# Patient Record
(demographics unavailable — no encounter records)

---

## 2025-05-04 NOTE — HEALTH RISK ASSESSMENT
[Good] : ~his/her~  mood as  good [Yes] : Yes [Monthly or less (1 pt)] : Monthly or less (1 point) [1 or 2 (0 pts)] : 1 or 2 (0 points) [Never (0 pts)] : Never (0 points) [No] : In the past 12 months have you used drugs other than those required for medical reasons? No [No falls in past year] : Patient reported no falls in the past year [0] : 2) Feeling down, depressed, or hopeless: Not at all (0) [PHQ-2 Negative - No further assessment needed] : PHQ-2 Negative - No further assessment needed [Never] : Never [Fully functional (bathing, dressing, toileting, transferring, walking, feeding)] : Fully functional (bathing, dressing, toileting, transferring, walking, feeding) [Fully functional (using the telephone, shopping, preparing meals, housekeeping, doing laundry, using] : Fully functional and needs no help or supervision to perform IADLs (using the telephone, shopping, preparing meals, housekeeping, doing laundry, using transportation, managing medications and managing finances) [Smoke Detector] : smoke detector [Seat Belt] :  uses seat belt [With Patient/Caregiver] : , with patient/caregiver [Name: ___] : Health Care Proxy's Name: [unfilled]  [Relationship: ___] : Relationship: [unfilled] [Very Good] : ~his/her~  mood as very good [No Retinopathy] : No retinopathy [Patient reported colonoscopy was normal] : Patient reported colonoscopy was normal [HIV test declined] : HIV test declined [Hepatitis C test declined] : Hepatitis C test declined [None] : None [With Significant Other] : lives with significant other [Employed] : employed [Single] : single [Sexually Active] : sexually active [de-identified] : see hpi [Audit-CScore] : 1 [de-identified] : NO [de-identified] : see hpi [de-identified] : see hpi [[id number]] : 0 [EyeExamDate] : 10/23 [Change in mental status noted] : No change in mental status noted [High Risk Behavior] : no high risk behavior [Reports changes in hearing] : Reports no changes in hearing [Reports changes in vision] : Reports no changes in vision [Reports changes in dental health] : Reports no changes in dental health [ColonoscopyDate] : 10/23 [ColonoscopyComments] : FIT negative, never did colonoscopy [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  [AdvancecareDate] : 05/25

## 2025-05-04 NOTE — HEALTH RISK ASSESSMENT
[Good] : ~his/her~  mood as  good [Yes] : Yes [Monthly or less (1 pt)] : Monthly or less (1 point) [1 or 2 (0 pts)] : 1 or 2 (0 points) [Never (0 pts)] : Never (0 points) [No] : In the past 12 months have you used drugs other than those required for medical reasons? No [No falls in past year] : Patient reported no falls in the past year [0] : 2) Feeling down, depressed, or hopeless: Not at all (0) [PHQ-2 Negative - No further assessment needed] : PHQ-2 Negative - No further assessment needed [Never] : Never [Fully functional (bathing, dressing, toileting, transferring, walking, feeding)] : Fully functional (bathing, dressing, toileting, transferring, walking, feeding) [Fully functional (using the telephone, shopping, preparing meals, housekeeping, doing laundry, using] : Fully functional and needs no help or supervision to perform IADLs (using the telephone, shopping, preparing meals, housekeeping, doing laundry, using transportation, managing medications and managing finances) [Smoke Detector] : smoke detector [Seat Belt] :  uses seat belt [With Patient/Caregiver] : , with patient/caregiver [Name: ___] : Health Care Proxy's Name: [unfilled]  [Relationship: ___] : Relationship: [unfilled] [Very Good] : ~his/her~  mood as very good [No Retinopathy] : No retinopathy [Patient reported colonoscopy was normal] : Patient reported colonoscopy was normal [HIV test declined] : HIV test declined [Hepatitis C test declined] : Hepatitis C test declined [None] : None [With Significant Other] : lives with significant other [Employed] : employed [Single] : single [Sexually Active] : sexually active [de-identified] : see hpi [Audit-CScore] : 1 [de-identified] : NO [de-identified] : see hpi [de-identified] : see hpi [[id number]] : 0 [EyeExamDate] : 10/23 [Change in mental status noted] : No change in mental status noted [High Risk Behavior] : no high risk behavior [Reports changes in hearing] : Reports no changes in hearing [Reports changes in vision] : Reports no changes in vision [Reports changes in dental health] : Reports no changes in dental health [ColonoscopyDate] : 10/23 [ColonoscopyComments] : FIT negative, never did colonoscopy [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  [AdvancecareDate] : 05/25

## 2025-05-04 NOTE — HISTORY OF PRESENT ILLNESS
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  He reports stable conditions. Denies hospitalization, ED visit nor significant condition changes. His family members are all well.  He is taking Vitamin D3 daily and MVI, and no prescription medications.   Has had the umbilical protrusion, soft and no pain from childhood. No other abdominal/inguinal hernia.  He did not see ophthalmologist for over 1 yr and does not wear contact lenses that recommended for Keratoconus.  -Diet: He cut down ice coffee, sweet food, starch diet and keeps very active. Goes to gym where his brother owns. -Exercise: regularly goes to gym for work out.  Denied fever, chills,CP, SOB, abdominal pain, n/v/c/d.  Prior 10/12/23 He reports stable condition. He is attending EMT school 3days a week and not working at this time. 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 He never smoked, does not drink EtOH, and no illicit drug use.  -Diet: eats healthy. Changed diet a bit especially Breakfast. No more oatmeal but fruit or peanut/almond butter for breakfast instead of cold cereal -Exercise: very actively engaged in exercise. Goes to gym regularly. Stopped OATMEAL for breakfast  Wanted a VARIETY Denied fever, chills,CP, SOB, abdominal pain, n/v/c/d.

## 2025-05-04 NOTE — HISTORY OF PRESENT ILLNESS
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  He reports stable conditions. Denies hospitalization, ED visit nor significant condition changes. His family members are all well.  He is taking Vitamin D3 daily and MVI, and no prescription medications.   Has had the umbilical protrusion, soft and no pain from childhood. No other abdominal/inguinal hernia.  He did not see ophthalmologist for over 1 yr and does not wear contact lenses that recommended for Keratoconus.  -Diet: He cut down ice coffee, sweet food, starch diet and keeps very active. Goes to gym where his brother owns. -Exercise: regularly goes to gym for work out.  Denied fever, chills,CP, SOB, abdominal pain, n/v/c/d.  Prior 10/12/23 He reports stable condition. He is attending EMT school 3days a week and not working at this time. 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 He never smoked, does not drink EtOH, and no illicit drug use.  -Diet: eats healthy. Changed diet a bit especially Breakfast. No more oatmeal but fruit or peanut/almond butter for breakfast instead of cold cereal -Exercise: very actively engaged in exercise. Goes to gym regularly. Stopped OATMEAL for breakfast  Wanted a VARIETY Denied fever, chills,CP, SOB, abdominal pain, n/v/c/d.

## 2025-05-04 NOTE — ASSESSMENT
[Vaccines Reviewed] : Immunizations reviewed today. Please see immunization details in the vaccine log within the immunization flowsheet.  [FreeTextEntry1] : Mr. DEIRDRE CLEMENT is a 48 year old  male  with history of  impaired fasting glucose, vit D deficiency presented today for comprehensive evaluation.  # HCM -COVID shot: got once. No booster. -Flu shot: offered but did not wish.  -Tdap: 9/28/17 utd -Colonoscopy: offered but deferred. Denied GI sx. FIT Rx and test kit given today.   # keratoconus OU Reminded to wear contact lenses and to see ophthalmology soon.   # umbilical hernia no red flag sign. Clinically monitor.  -check lab as planned. -F/up in 1 year or prn.

## 2025-05-04 NOTE — HISTORY OF PRESENT ILLNESS
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  He reports stable conditions. Denies hospitalization, ED visit nor significant condition changes. His family members are all well.  He is taking Vitamin D3 daily and MVI, and no prescription medications.   Has had the umbilical protrusion, soft and no pain from childhood. No other abdominal/inguinal hernia.  He did not see ophthalmologist for over 1 yr and does not wear contact lenses that recommended for Keratoconus.  -Diet: He cut down ice coffee, sweet food, starch diet and keeps very active. Goes to gym where his brother owns. -Exercise: regularly goes to gym for work out.  Denied fever, chills,CP, SOB, abdominal pain, n/v/c/d.  Prior 10/12/23 He reports stable condition. He is attending EMT school 3days a week and not working at this time. 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 He never smoked, does not drink EtOH, and no illicit drug use.  -Diet: eats healthy. Changed diet a bit especially Breakfast. No more oatmeal but fruit or peanut/almond butter for breakfast instead of cold cereal -Exercise: very actively engaged in exercise. Goes to gym regularly. Stopped OATMEAL for breakfast  Wanted a VARIETY Denied fever, chills,CP, SOB, abdominal pain, n/v/c/d.

## 2025-05-04 NOTE — HEALTH RISK ASSESSMENT
[Good] : ~his/her~  mood as  good [Yes] : Yes [Monthly or less (1 pt)] : Monthly or less (1 point) [1 or 2 (0 pts)] : 1 or 2 (0 points) [Never (0 pts)] : Never (0 points) [No] : In the past 12 months have you used drugs other than those required for medical reasons? No [No falls in past year] : Patient reported no falls in the past year [0] : 2) Feeling down, depressed, or hopeless: Not at all (0) [PHQ-2 Negative - No further assessment needed] : PHQ-2 Negative - No further assessment needed [Never] : Never [Fully functional (bathing, dressing, toileting, transferring, walking, feeding)] : Fully functional (bathing, dressing, toileting, transferring, walking, feeding) [Fully functional (using the telephone, shopping, preparing meals, housekeeping, doing laundry, using] : Fully functional and needs no help or supervision to perform IADLs (using the telephone, shopping, preparing meals, housekeeping, doing laundry, using transportation, managing medications and managing finances) [Smoke Detector] : smoke detector [Seat Belt] :  uses seat belt [With Patient/Caregiver] : , with patient/caregiver [Name: ___] : Health Care Proxy's Name: [unfilled]  [Relationship: ___] : Relationship: [unfilled] [Very Good] : ~his/her~  mood as very good [No Retinopathy] : No retinopathy [Patient reported colonoscopy was normal] : Patient reported colonoscopy was normal [HIV test declined] : HIV test declined [Hepatitis C test declined] : Hepatitis C test declined [None] : None [With Significant Other] : lives with significant other [Employed] : employed [Single] : single [Sexually Active] : sexually active [de-identified] : see hpi [Audit-CScore] : 1 [de-identified] : NO [de-identified] : see hpi [de-identified] : see hpi [[id number]] : 0 [EyeExamDate] : 10/23 [Change in mental status noted] : No change in mental status noted [High Risk Behavior] : no high risk behavior [Reports changes in hearing] : Reports no changes in hearing [Reports changes in vision] : Reports no changes in vision [Reports changes in dental health] : Reports no changes in dental health [ColonoscopyDate] : 10/23 [ColonoscopyComments] : FIT negative, never did colonoscopy [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  [AdvancecareDate] : 05/25

## 2025-05-04 NOTE — PHYSICAL EXAM
[No Acute Distress] : no acute distress [Well Nourished] : well nourished [Well Developed] : well developed [Well-Appearing] : well-appearing [Normal Sclera/Conjunctiva] : normal sclera/conjunctiva [PERRL] : pupils equal round and reactive to light [EOMI] : extraocular movements intact [Normal Outer Ear/Nose] : the outer ears and nose were normal in appearance [Normal Oropharynx] : the oropharynx was normal [No JVD] : no jugular venous distention [No Lymphadenopathy] : no lymphadenopathy [Supple] : supple [Thyroid Normal, No Nodules] : the thyroid was normal and there were no nodules present [No Respiratory Distress] : no respiratory distress  [No Accessory Muscle Use] : no accessory muscle use [Clear to Auscultation] : lungs were clear to auscultation bilaterally [Normal Rate] : normal rate  [Regular Rhythm] : with a regular rhythm [Normal S1, S2] : normal S1 and S2 [No Murmur] : no murmur heard [No Carotid Bruits] : no carotid bruits [No Abdominal Bruit] : a ~M bruit was not heard ~T in the abdomen [No Varicosities] : no varicosities [Pedal Pulses Present] : the pedal pulses are present [No Edema] : there was no peripheral edema [No Palpable Aorta] : no palpable aorta [No Extremity Clubbing/Cyanosis] : no extremity clubbing/cyanosis [Soft] : abdomen soft [Non Tender] : non-tender [Non-distended] : non-distended [No Masses] : no abdominal mass palpated [No HSM] : no HSM [Normal Bowel Sounds] : normal bowel sounds [No CVA Tenderness] : no CVA  tenderness [No Spinal Tenderness] : no spinal tenderness [No Joint Swelling] : no joint swelling [Grossly Normal Strength/Tone] : grossly normal strength/tone [No Rash] : no rash [Coordination Grossly Intact] : coordination grossly intact [No Focal Deficits] : no focal deficits [Normal Gait] : normal gait [Deep Tendon Reflexes (DTR)] : deep tendon reflexes were 2+ and symmetric [Normal Affect] : the affect was normal [Normal Insight/Judgement] : insight and judgment were intact [de-identified] : small soft umbilical hernia, reducible. no skin color change or pain

## 2025-05-04 NOTE — PHYSICAL EXAM
[No Acute Distress] : no acute distress [Well Nourished] : well nourished [Well Developed] : well developed [Well-Appearing] : well-appearing [Normal Sclera/Conjunctiva] : normal sclera/conjunctiva [PERRL] : pupils equal round and reactive to light [EOMI] : extraocular movements intact [Normal Outer Ear/Nose] : the outer ears and nose were normal in appearance [Normal Oropharynx] : the oropharynx was normal [No JVD] : no jugular venous distention [No Lymphadenopathy] : no lymphadenopathy [Supple] : supple [Thyroid Normal, No Nodules] : the thyroid was normal and there were no nodules present [No Respiratory Distress] : no respiratory distress  [No Accessory Muscle Use] : no accessory muscle use [Clear to Auscultation] : lungs were clear to auscultation bilaterally [Normal Rate] : normal rate  [Regular Rhythm] : with a regular rhythm [Normal S1, S2] : normal S1 and S2 [No Murmur] : no murmur heard [No Carotid Bruits] : no carotid bruits [No Abdominal Bruit] : a ~M bruit was not heard ~T in the abdomen [No Varicosities] : no varicosities [Pedal Pulses Present] : the pedal pulses are present [No Edema] : there was no peripheral edema [No Palpable Aorta] : no palpable aorta [No Extremity Clubbing/Cyanosis] : no extremity clubbing/cyanosis [Soft] : abdomen soft [Non Tender] : non-tender [Non-distended] : non-distended [No Masses] : no abdominal mass palpated [No HSM] : no HSM [Normal Bowel Sounds] : normal bowel sounds [No CVA Tenderness] : no CVA  tenderness [No Spinal Tenderness] : no spinal tenderness [No Joint Swelling] : no joint swelling [Grossly Normal Strength/Tone] : grossly normal strength/tone [No Rash] : no rash [Coordination Grossly Intact] : coordination grossly intact [No Focal Deficits] : no focal deficits [Normal Gait] : normal gait [Deep Tendon Reflexes (DTR)] : deep tendon reflexes were 2+ and symmetric [Normal Affect] : the affect was normal [Normal Insight/Judgement] : insight and judgment were intact [de-identified] : small soft umbilical hernia, reducible. no skin color change or pain

## 2025-05-04 NOTE — REVIEW OF SYSTEMS
[Negative] : Genitourinary [Fever] : no fever [Discharge] : no discharge [Earache] : no earache [Chest Pain] : no chest pain [Palpitations] : no palpitations [Claudication] : no  leg claudication [Orthopena] : no orthopnea [Dysuria] : no dysuria [Skin Rash] : no skin rash [Dizziness] : no dizziness [Anxiety] : no anxiety [Depression] : no depression

## 2025-05-04 NOTE — PHYSICAL EXAM
[No Acute Distress] : no acute distress [Well Nourished] : well nourished [Well Developed] : well developed [Well-Appearing] : well-appearing [Normal Sclera/Conjunctiva] : normal sclera/conjunctiva [PERRL] : pupils equal round and reactive to light [EOMI] : extraocular movements intact [Normal Outer Ear/Nose] : the outer ears and nose were normal in appearance [Normal Oropharynx] : the oropharynx was normal [No JVD] : no jugular venous distention [No Lymphadenopathy] : no lymphadenopathy [Supple] : supple [Thyroid Normal, No Nodules] : the thyroid was normal and there were no nodules present [No Respiratory Distress] : no respiratory distress  [No Accessory Muscle Use] : no accessory muscle use [Clear to Auscultation] : lungs were clear to auscultation bilaterally [Normal Rate] : normal rate  [Regular Rhythm] : with a regular rhythm [Normal S1, S2] : normal S1 and S2 [No Murmur] : no murmur heard [No Carotid Bruits] : no carotid bruits [No Abdominal Bruit] : a ~M bruit was not heard ~T in the abdomen [No Varicosities] : no varicosities [Pedal Pulses Present] : the pedal pulses are present [No Edema] : there was no peripheral edema [No Palpable Aorta] : no palpable aorta [No Extremity Clubbing/Cyanosis] : no extremity clubbing/cyanosis [Soft] : abdomen soft [Non Tender] : non-tender [Non-distended] : non-distended [No Masses] : no abdominal mass palpated [No HSM] : no HSM [Normal Bowel Sounds] : normal bowel sounds [No CVA Tenderness] : no CVA  tenderness [No Spinal Tenderness] : no spinal tenderness [No Joint Swelling] : no joint swelling [Grossly Normal Strength/Tone] : grossly normal strength/tone [No Rash] : no rash [Coordination Grossly Intact] : coordination grossly intact [No Focal Deficits] : no focal deficits [Normal Gait] : normal gait [Deep Tendon Reflexes (DTR)] : deep tendon reflexes were 2+ and symmetric [Normal Affect] : the affect was normal [Normal Insight/Judgement] : insight and judgment were intact [de-identified] : small soft umbilical hernia, reducible. no skin color change or pain